# Patient Record
Sex: FEMALE | Race: WHITE | NOT HISPANIC OR LATINO | Employment: OTHER | URBAN - METROPOLITAN AREA
[De-identification: names, ages, dates, MRNs, and addresses within clinical notes are randomized per-mention and may not be internally consistent; named-entity substitution may affect disease eponyms.]

---

## 2024-09-20 ENCOUNTER — APPOINTMENT (OUTPATIENT)
Dept: RADIOLOGY | Facility: HOSPITAL | Age: 79
End: 2024-09-20
Payer: MEDICARE

## 2024-09-20 ENCOUNTER — HOSPITAL ENCOUNTER (EMERGENCY)
Facility: HOSPITAL | Age: 79
Discharge: HOME | End: 2024-09-20
Attending: STUDENT IN AN ORGANIZED HEALTH CARE EDUCATION/TRAINING PROGRAM
Payer: MEDICARE

## 2024-09-20 VITALS
SYSTOLIC BLOOD PRESSURE: 199 MMHG | DIASTOLIC BLOOD PRESSURE: 80 MMHG | TEMPERATURE: 97.7 F | OXYGEN SATURATION: 97 % | HEART RATE: 67 BPM | RESPIRATION RATE: 17 BRPM
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
## 2024-09-20 PROCEDURE — 70486 CT MAXILLOFACIAL W/O DYE: CPT

## 2024-09-20 PROCEDURE — 2500000004 HC RX 250 GENERAL PHARMACY W/ HCPCS (ALT 636 FOR OP/ED): Performed by: STUDENT IN AN ORGANIZED HEALTH CARE EDUCATION/TRAINING PROGRAM

## 2024-09-20 PROCEDURE — 76377 3D RENDER W/INTRP POSTPROCES: CPT | Performed by: RADIOLOGY

## 2024-09-20 PROCEDURE — 99285 EMERGENCY DEPT VISIT HI MDM: CPT | Mod: 25

## 2024-09-20 PROCEDURE — 72125 CT NECK SPINE W/O DYE: CPT

## 2024-09-20 PROCEDURE — 90471 IMMUNIZATION ADMIN: CPT | Performed by: STUDENT IN AN ORGANIZED HEALTH CARE EDUCATION/TRAINING PROGRAM

## 2024-09-20 PROCEDURE — 90715 TDAP VACCINE 7 YRS/> IM: CPT | Performed by: STUDENT IN AN ORGANIZED HEALTH CARE EDUCATION/TRAINING PROGRAM

## 2024-09-20 PROCEDURE — 70486 CT MAXILLOFACIAL W/O DYE: CPT | Performed by: RADIOLOGY

## 2024-09-20 PROCEDURE — 76377 3D RENDER W/INTRP POSTPROCES: CPT

## 2024-09-20 PROCEDURE — 72125 CT NECK SPINE W/O DYE: CPT | Performed by: RADIOLOGY

## 2024-09-20 PROCEDURE — 73130 X-RAY EXAM OF HAND: CPT | Mod: LEFT SIDE | Performed by: RADIOLOGY

## 2024-09-20 PROCEDURE — 2500000001 HC RX 250 WO HCPCS SELF ADMINISTERED DRUGS (ALT 637 FOR MEDICARE OP)

## 2024-09-20 PROCEDURE — 73130 X-RAY EXAM OF HAND: CPT | Mod: LT

## 2024-09-20 PROCEDURE — 70450 CT HEAD/BRAIN W/O DYE: CPT | Performed by: RADIOLOGY

## 2024-09-20 PROCEDURE — 70450 CT HEAD/BRAIN W/O DYE: CPT

## 2024-09-20 RX ORDER — BACITRACIN ZINC 500 UNIT/G
OINTMENT IN PACKET (EA) TOPICAL 3 TIMES DAILY
Status: DISCONTINUED | OUTPATIENT
Start: 2024-09-20 | End: 2024-09-20

## 2024-09-20 RX ORDER — BACITRACIN ZINC 500 UNIT/G
OINTMENT IN PACKET (EA) TOPICAL ONCE
Status: COMPLETED | OUTPATIENT
Start: 2024-09-20 | End: 2024-09-20

## 2024-09-20 RX ORDER — ACETAMINOPHEN 325 MG/1
975 TABLET ORAL ONCE
Status: COMPLETED | OUTPATIENT
Start: 2024-09-20 | End: 2024-09-20

## 2024-09-20 ASSESSMENT — LIFESTYLE VARIABLES
EVER FELT BAD OR GUILTY ABOUT YOUR DRINKING: NO
EVER HAD A DRINK FIRST THING IN THE MORNING TO STEADY YOUR NERVES TO GET RID OF A HANGOVER: NO
HAVE PEOPLE ANNOYED YOU BY CRITICIZING YOUR DRINKING: NO
TOTAL SCORE: 0
HAVE YOU EVER FELT YOU SHOULD CUT DOWN ON YOUR DRINKING: NO

## 2024-09-20 ASSESSMENT — PAIN SCALES - GENERAL: PAINLEVEL_OUTOF10: 3

## 2024-09-20 ASSESSMENT — PAIN - FUNCTIONAL ASSESSMENT: PAIN_FUNCTIONAL_ASSESSMENT: 0-10

## 2024-09-20 NOTE — ED PROVIDER NOTES
History of Present Illness     History provided by: Patient  Limitations to History: None  External Records Reviewed with Brief Summary: None    HPI:  Radha Jewell is a 79-year-old female history of CKD, hypertension, GARIMA, pulmonary artery aneurysm, T2DM, A-fib on Eliquis presenting to the ED after a fall.  Patient suffered a fall with head strike, hitting the left side of her head with a small hematoma formed.  No loss of consciousness, no lightheadedness, no dizziness, numbness, tingling, weakness.  Patient states she lost her balance, denies any presyncopal symptoms or symptoms preceding fall.  She was able to get up, she does have some left third finger pain as well.  Denies any additional injuries, was able to get up and ambulate after the fall.    Physical Exam   Triage vitals:  T 36.5 °C (97.7 °F)  HR 78  /78  RR 18  O2 95 % None (Room air)    General: Awake, alert, in no acute distress, non-toxic appearing  Eyes: Gaze conjugate.  No scleral icterus or injection, pupils 3-2 equal reactive bilaterally  HENT: Normo-cephalic.  Bruising and hematoma formation over left temporal region.  No congestion.  Midface stable.  External auditory canals without erythema or drainage.    CV: Regular rate, regular rhythm. Cap refill less than 2 seconds  Resp: Breathing non-labored  GI: Soft, non-distended, non-tender. No rebound or guarding.  MSK/Extremities: No gross bony deformities. Moving all extremities.  Tenderness left third digit without bruising, swelling or malalignment.  No snuffbox tenderness bilaterally.  Skin: Warm. Appropriate color  Neuro: Awake and Alert.  GCS 15, oriented x 3.  Face symmetric. Moving all extremities.  Strength and sensation intact.    Medical Decision Making & ED Course   Medical Decision Makin y.o. female presenting to the ED after a fall on Eliquis.  Fall was mechanical in nature, no presyncopal symptoms.  Patient had positive head strike, small hematoma superficial  abrasion to the left temporal region.  ANO x 3, GCS 15 without any neurologic deficits.  Additionally having some pain on the left third digit.  CT head, C-spine and facial bones obtained and negative, no bleed.  Hand x-ray showing no acute fracture, severe osteoarthritic changes of the hand.  Finger eddie taped.  Tylenol for analgesia.  Workup otherwise unremarkable, patient ambulatory, discharged with return precautions.  ----      Differential diagnoses considered include but are not limited to: ICH, bleed, fracture, dislocation     Social Determinants of Health which Significantly Impact Care: None identified     EKG Independent Interpretation: See ED course for my independent interpretation if ECG was obtained.    Independent Result Review and Interpretation: Please see MDM and ED course for my independent interpretation of the results    Chronic conditions affecting the patient's care: Please see H&P and [facility]    The patient was discussed with the following consultants/services: None    Care Considerations: As document above in [facility]    ED Course:  ED Course as of 09/20/24 1249   Fri Sep 20, 2024   1207 CT head wo IV contrast  CT head showing no acute infarct or bleed [KR]   1208 CT cervical spine wo IV contrast  CT cervical spine showing no acute fracture [KR]   1220 CT maxillofacial bones wo IV contrast  CT showing no facial bone fracture [KR]   1244 XR hand left 3+ views  X-ray hand showing osteoarthritic and erosive changes in the hand and wrist, no acute fracture. [KR]      ED Course User Index  [KR] Alexandria Walker,          Diagnoses as of 09/20/24 1249   Fall, initial encounter     Disposition   As a result of the work-up, the patient was discharged home.  she was informed of her diagnosis and instructed to come back with any concerns or worsening of condition.  she and was agreeable to the plan as discussed above.  she was given the opportunity to ask questions.  All of the patient's questions were  answered.    Procedures   Procedures    Patient seen and discussed with attending physician    Alexandria Walker DO  Emergency Medicine     Alexandria Walker DO  Resident  09/20/24 [id number]

## 2024-09-20 NOTE — DISCHARGE INSTRUCTIONS
Your CT did not show any bleed.  Your x-ray did not show fractures, you do have arthritis in your hand.  You may take Tylenol for pain.  Return for any concerns

## 2025-05-03 ENCOUNTER — HOSPITAL ENCOUNTER (EMERGENCY)
Facility: HOSPITAL | Age: 80
Discharge: HOME | End: 2025-05-03
Attending: STUDENT IN AN ORGANIZED HEALTH CARE EDUCATION/TRAINING PROGRAM
Payer: MEDICARE

## 2025-05-03 ENCOUNTER — APPOINTMENT (OUTPATIENT)
Dept: RADIOLOGY | Facility: HOSPITAL | Age: 80
End: 2025-05-03
Payer: MEDICARE

## 2025-05-03 VITALS
SYSTOLIC BLOOD PRESSURE: 124 MMHG | BODY MASS INDEX: 37.99 KG/M2 | HEIGHT: 65 IN | WEIGHT: 228 LBS | RESPIRATION RATE: 16 BRPM | DIASTOLIC BLOOD PRESSURE: 84 MMHG | HEART RATE: 57 BPM | TEMPERATURE: 98.1 F | OXYGEN SATURATION: 97 %

## 2025-05-03 DIAGNOSIS — S09.90XA HEAD INJURY, INITIAL ENCOUNTER: Primary | ICD-10-CM

## 2025-05-03 DIAGNOSIS — S00.83XA CONTUSION OF FACE, INITIAL ENCOUNTER: ICD-10-CM

## 2025-05-03 PROBLEM — Z97.4 WEARS HEARING AID IN BOTH EARS: Status: ACTIVE | Noted: 2023-03-22

## 2025-05-03 PROBLEM — H90.A22 SENSORINEURAL HEARING LOSS (SNHL) OF LEFT EAR WITH RESTRICTED HEARING OF RIGHT EAR: Status: ACTIVE | Noted: 2017-11-02

## 2025-05-03 PROBLEM — E66.01 MORBID (SEVERE) OBESITY DUE TO EXCESS CALORIES (MULTI): Status: ACTIVE | Noted: 2025-05-03

## 2025-05-03 PROBLEM — G47.33 OSA (OBSTRUCTIVE SLEEP APNEA): Status: ACTIVE | Noted: 2022-04-07

## 2025-05-03 PROBLEM — I28.1 PULMONARY ARTERY ANEURYSM (MULTI): Status: ACTIVE | Noted: 2022-05-09

## 2025-05-03 PROBLEM — E11.29 TYPE 2 DIABETES MELLITUS WITH ALBUMINURIA (MULTI): Status: ACTIVE | Noted: 2022-12-15

## 2025-05-03 PROBLEM — E66.812 OBESITY, CLASS II, BMI 35-39.9: Status: ACTIVE | Noted: 2019-05-16

## 2025-05-03 PROBLEM — E11.3293 CONTROLLED TYPE 2 DIABETES MELLITUS WITH BOTH EYES AFFECTED BY MILD NONPROLIFERATIVE RETINOPATHY WITHOUT MACULAR EDEMA, WITHOUT LONG-TERM CURRENT USE OF INSULIN: Status: ACTIVE | Noted: 2017-10-31

## 2025-05-03 PROBLEM — H90.A31 MIXED CONDUCTIVE AND SENSORINEURAL HEARING LOSS OF RIGHT EAR WITH RESTRICTED HEARING OF LEFT EAR: Status: ACTIVE | Noted: 2017-11-02

## 2025-05-03 PROBLEM — E21.3 HYPERPARATHYROIDISM (MULTI): Status: ACTIVE | Noted: 2022-12-15

## 2025-05-03 PROBLEM — D17.0 LIPOMA OF SKIN AND SUBCUTANEOUS TISSUE OF NECK: Status: ACTIVE | Noted: 2022-12-14

## 2025-05-03 PROBLEM — I48.0 PAROXYSMAL ATRIAL FIBRILLATION (MULTI): Status: ACTIVE | Noted: 2022-04-07

## 2025-05-03 PROBLEM — I12.9 HYPERTENSIVE KIDNEY DISEASE WITH STAGE 3A CHRONIC KIDNEY DISEASE (MULTI): Status: ACTIVE | Noted: 2022-04-05

## 2025-05-03 PROBLEM — R80.9 TYPE 2 DIABETES MELLITUS WITH ALBUMINURIA (MULTI): Status: ACTIVE | Noted: 2022-12-15

## 2025-05-03 PROBLEM — N18.31 HYPERTENSIVE KIDNEY DISEASE WITH STAGE 3A CHRONIC KIDNEY DISEASE (MULTI): Status: ACTIVE | Noted: 2022-04-05

## 2025-05-03 PROBLEM — M17.12 PRIMARY OSTEOARTHRITIS OF LEFT KNEE: Status: ACTIVE | Noted: 2019-10-02

## 2025-05-03 PROBLEM — N39.41 URGE INCONTINENCE: Status: ACTIVE | Noted: 2023-08-31

## 2025-05-03 PROBLEM — N18.31 STAGE 3A CHRONIC KIDNEY DISEASE (MULTI): Status: ACTIVE | Noted: 2022-04-05

## 2025-05-03 PROCEDURE — 72125 CT NECK SPINE W/O DYE: CPT

## 2025-05-03 PROCEDURE — 70450 CT HEAD/BRAIN W/O DYE: CPT | Performed by: STUDENT IN AN ORGANIZED HEALTH CARE EDUCATION/TRAINING PROGRAM

## 2025-05-03 PROCEDURE — 72125 CT NECK SPINE W/O DYE: CPT | Performed by: STUDENT IN AN ORGANIZED HEALTH CARE EDUCATION/TRAINING PROGRAM

## 2025-05-03 PROCEDURE — 99284 EMERGENCY DEPT VISIT MOD MDM: CPT | Mod: 25 | Performed by: STUDENT IN AN ORGANIZED HEALTH CARE EDUCATION/TRAINING PROGRAM

## 2025-05-03 PROCEDURE — 70450 CT HEAD/BRAIN W/O DYE: CPT

## 2025-05-03 PROCEDURE — 99285 EMERGENCY DEPT VISIT HI MDM: CPT | Mod: 25

## 2025-05-03 ASSESSMENT — PAIN DESCRIPTION - PAIN TYPE: TYPE: ACUTE PAIN

## 2025-05-03 ASSESSMENT — LIFESTYLE VARIABLES
EVER HAD A DRINK FIRST THING IN THE MORNING TO STEADY YOUR NERVES TO GET RID OF A HANGOVER: NO
HAVE YOU EVER FELT YOU SHOULD CUT DOWN ON YOUR DRINKING: NO
HAVE PEOPLE ANNOYED YOU BY CRITICIZING YOUR DRINKING: NO
TOTAL SCORE: 0
EVER FELT BAD OR GUILTY ABOUT YOUR DRINKING: NO

## 2025-05-03 ASSESSMENT — COLUMBIA-SUICIDE SEVERITY RATING SCALE - C-SSRS
2. HAVE YOU ACTUALLY HAD ANY THOUGHTS OF KILLING YOURSELF?: NO
6. HAVE YOU EVER DONE ANYTHING, STARTED TO DO ANYTHING, OR PREPARED TO DO ANYTHING TO END YOUR LIFE?: NO
1. IN THE PAST MONTH, HAVE YOU WISHED YOU WERE DEAD OR WISHED YOU COULD GO TO SLEEP AND NOT WAKE UP?: NO

## 2025-05-03 ASSESSMENT — PAIN SCALES - GENERAL: PAINLEVEL_OUTOF10: 3

## 2025-05-03 ASSESSMENT — PAIN - FUNCTIONAL ASSESSMENT: PAIN_FUNCTIONAL_ASSESSMENT: 0-10

## 2025-05-03 ASSESSMENT — PAIN DESCRIPTION - LOCATION: LOCATION: HEAD

## 2025-05-03 ASSESSMENT — PAIN DESCRIPTION - DESCRIPTORS: DESCRIPTORS: TIGHTNESS

## 2025-05-03 NOTE — ED PROVIDER NOTES
History/Exam limitations: none  HPI was provided by patient    HPI:    Chief Complaint   Patient presents with    Head Injury     Pt was in bed when she was dreaming about a spider and she got tangled in her CPAP causing her to fall out of bed, landing on her face. Pt is on eliquis.        Radha Jewell is a 80 y.o. female presents with chief complaint of fall.  Patient is on Eliquis.  Other past medical history reviewed below.  Patient states she is in a dream when she jumped [employer] coming down landing on her face and then she woke up causing her to fall and hit the floor.  Has an abrasion above her left eyebrow.  Did not lose consciousness.  States tetanus is up-to-date.  No treatment tried prior to arrival.          ROS: All other review of systems are negative except as noted above and HPI or ROS.   CONSTITUTIONAL:       fever, chills  EYES:      Blurry vision, change in vision  ENT:       sore throat, congestion, rhinorrhea  CARDIOVASCULAR:       chest pain, palpitations, swelling  RESPIRATORY:       cough, wheeze, shortness of breath  GI:       nausea, vomiting, diarrhea, abdominal pain  GENITOURINARY:       dysuria, hematuria, frequency  MUSCULOSKELETAL:       deformity, neck pain  SKIN:       rash, lesion  NEUROLOGIC:       headache, numbness, focal weakness  ENDOCRINE:      weight loss, fatigue  NOTES: All systems reviewed, negative except as described above       Physical Exam:  GENERAL: Alert, at baseline mentality, cooperative,  in no acute distress.    HEAD: normocephalic, atraumatic    SKIN:  dry skin, abrasion on left eyebrow.    EYES: PERRL, EOMs intact,  Conjunctiva pink with no erythema or exudates. No scleral icterus.     ENT: No external deformities. Nares patent, mucus membranes moist.  Pharynx clear, uvula midline.     NECK: Supple, without meningismus. Trachea at midline. No lymphadenopathy.    PULMONARY: Clear bilaterally. No crackles, rhonchi, wheezing.  No respiratory distress.  No work of  breathing.    CARDIAC: Regular rate and regular rhythm.  Pulses 2+ in radials and dorsal pedal pulses bilaterally.  No murmur, rub, gallop.  No edema.    ABDOMEN: Soft, nontender, active bowel sounds.  No palpable organomegaly.  No rebound or guarding.  No CVA tenderness.  No pulsatile masses.    MUSCULOSKELETAL: Full range of motion throughout, no deformity.  No tender to palpation step-offs or deformities down CT or L-spine.  No tender palpation to bilateral lower extremities.    NEUROLOGICAL:  CN II through XII are grossly intact, no focal neuro deficits.  Strength 5 out of 5 throughout bilateral upper and lower extremities neurovascular intact in bilateral upper and lower extremities    PSYCHIATRIC: Appropriate mood and affect. Calm.       MDM/ED COURSE:    The patient presented for evaluation of a fall.  Differential but not limited to intracranial hemorrhage, fracture.  Imaging studies if performed were independently reviewed and interpreted by myself and confirmed by radiologist.  Imaging reassuring.  Tetanus was up-to-date.  Wound cleaned and covered bandage.    Patient feels safe for discharge home.  Patient nontoxic-appearing on reexamination.  Vital signs are stable.   The patient and caregiver are in agreement with the plan and given instructions to follow up with their PCP.         I discussed the differential, results and plan with the patient and/or family/friend/caregiver if present.       I emphasized the importance of follow-up with the physician I referred them to in the timeframe recommended.  I explained reasons for the patient to return to the Emergency Department. Additional verbal discharge instructions were also given and discussed with the patient to supplement those generated by the EMR. We also discussed medications that were prescribed (if any) including common side effects and interactions. The patient was advised to abstain from driving, operating heavy machinery or making significant  decisions while taking medications such as opiates and muscle relaxers that may impair this. All questions were addressed.  They understand return precautions and discharge instructions. The patient and/or family/friend/caregiver expressed understanding.     Note: This note was dictated by speech recognition. Minor errors in transcription may be present.    ED Course as of 05/03/25 0400   Sat May 03, 2025   0358 CT head wo IV contrast [WL]   0358 CT cervical spine wo IV contrast  No acute intracranial abnormality.      No evidence of cervical spine fracture or acute traumatic  malalignment. Mild-to-moderate degenerative changes in mid and lower  cervical spine as described above.   [WL]      ED Course User Index  [WL] Ascencion Simon,          Diagnoses as of 05/03/25 0400   Head injury, initial encounter   Contusion of face, initial encounter         Medical History[1]   Social History[2]  No current outpatient medications  RX Allergies[3]          ED Triage Vitals [05/03/25 0234]   Temperature Heart Rate Respirations BP   36.7 °C (98.1 °F) 64 16 161/69      Pulse Ox Temp src Heart Rate Source Patient Position   97 % -- -- --      BP Location FiO2 (%)     -- --               Labs and Imaging  CT head wo IV contrast   Final Result   No acute intracranial abnormality.        No evidence of cervical spine fracture or acute traumatic   malalignment. Mild-to-moderate degenerative changes in mid and lower   cervical spine as described above.        Signed by: Guy Jara 5/3/2025 3:39 AM   Dictation workstation:   [id number]      CT cervical spine wo IV contrast   Final Result   No acute intracranial abnormality.        No evidence of cervical spine fracture or acute traumatic   malalignment. Mild-to-moderate degenerative changes in mid and lower   cervical spine as described above.        Signed by: Guy Jara 5/3/2025 3:39 AM   Dictation workstation:   [id number]        Labs Reviewed - No data to  display        Procedure  Procedures                    [1] History reviewed. No pertinent past medical history.  [2]   Social History  Socioeconomic History    Marital status:    Tobacco Use    Smoking status: Never    Smokeless tobacco: Never     Social Drivers of Health     Financial Resource Strain: Low Risk  (12/12/2024)    Received from [facility]    Overall Financial Resource Strain (CARDIA)     Difficulty of Paying Living Expenses: Not very hard   Food Insecurity: No Food Insecurity (12/12/2024)    Received from [facility]    Hunger Vital Sign     Worried About Running Out of Food in the Last Year: Never true     Ran Out of Food in the Last Year: Never true   Transportation Needs: No Transportation Needs (12/12/2024)    Received from [facility]    PRAPARE - Transportation     Lack of Transportation (Medical): No     Lack of Transportation (Non-Medical): No   Physical Activity: Inactive (12/12/2024)    Received from [facility]    Exercise Vital Sign     Days of Exercise per Week: 0 days     Minutes of Exercise per Session: 0 min   Stress: No Stress Concern Present (12/16/2023)    Received from [facility]    [employer] - Occupational Stress Questionnaire     Feeling of Stress : Not at all   Social Connections: Moderately Integrated (12/12/2024)    Received from [facility]    Social Connection and Isolation Panel [NHANES]     Frequency of Communication with Friends and Family: More than three times a week     Frequency of Social Gatherings with Friends and Family: More than three times a week     Attends Christian Services: More than 4 times per year     Active Member of Clubs or Organizations: Yes     Attends Club or Organization Meetings: More than 4 times per year     Marital Status:    [3]   Allergies  Allergen Reactions    Lisinopril Cough        Ascencion Simon DO  05/03/25 [id number]